# Patient Record
Sex: MALE | URBAN - METROPOLITAN AREA
[De-identification: names, ages, dates, MRNs, and addresses within clinical notes are randomized per-mention and may not be internally consistent; named-entity substitution may affect disease eponyms.]

---

## 2018-06-01 ENCOUNTER — APPOINTMENT (RX ONLY)
Dept: URBAN - METROPOLITAN AREA CLINIC 128 | Facility: CLINIC | Age: 38
Setting detail: DERMATOLOGY
End: 2018-06-01

## 2018-06-01 DIAGNOSIS — L71.8 OTHER ROSACEA: ICD-10-CM

## 2018-06-01 DIAGNOSIS — L738 OTHER SPECIFIED DISEASES OF HAIR AND HAIR FOLLICLES: ICD-10-CM

## 2018-06-01 DIAGNOSIS — L73.9 FOLLICULAR DISORDER, UNSPECIFIED: ICD-10-CM

## 2018-06-01 DIAGNOSIS — L72.0 EPIDERMAL CYST: ICD-10-CM

## 2018-06-01 DIAGNOSIS — Z00.00 ENCOUNTER FOR GENERAL ADULT MEDICAL EXAMINATION WITHOUT ABNORMAL FINDINGS: ICD-10-CM

## 2018-06-01 DIAGNOSIS — L70.8 OTHER ACNE: ICD-10-CM

## 2018-06-01 DIAGNOSIS — L663 OTHER SPECIFIED DISEASES OF HAIR AND HAIR FOLLICLES: ICD-10-CM

## 2018-06-01 DIAGNOSIS — L81.4 OTHER MELANIN HYPERPIGMENTATION: ICD-10-CM

## 2018-06-01 PROBLEM — Z71.1 PERSON WITH FEARED HEALTH COMPLAINT IN WHOM NO DIAGNOSIS IS MADE: Status: ACTIVE | Noted: 2018-06-01

## 2018-06-01 PROBLEM — L02.223 FURUNCLE OF CHEST WALL: Status: ACTIVE | Noted: 2018-06-01

## 2018-06-01 PROBLEM — L02.222 FURUNCLE OF BACK [ANY PART, EXCEPT BUTTOCK]: Status: ACTIVE | Noted: 2018-06-01

## 2018-06-01 PROCEDURE — ? COUNSELING

## 2018-06-01 PROCEDURE — ? TREATMENT REGIMEN

## 2018-06-01 PROCEDURE — 99203 OFFICE O/P NEW LOW 30 MIN: CPT

## 2018-06-01 ASSESSMENT — LOCATION SIMPLE DESCRIPTION DERM
LOCATION SIMPLE: CHEST
LOCATION SIMPLE: LEFT CHEEK
LOCATION SIMPLE: UPPER BACK
LOCATION SIMPLE: RIGHT FOREARM
LOCATION SIMPLE: LEFT FOREARM
LOCATION SIMPLE: RIGHT FOOT
LOCATION SIMPLE: LEFT UPPER BACK
LOCATION SIMPLE: RIGHT UPPER BACK
LOCATION SIMPLE: PENIS

## 2018-06-01 ASSESSMENT — LOCATION DETAILED DESCRIPTION DERM
LOCATION DETAILED: LEFT INFERIOR CENTRAL MALAR CHEEK
LOCATION DETAILED: RIGHT LATERAL PLANTAR FOOT
LOCATION DETAILED: RIGHT PROXIMAL DORSAL FOREARM
LOCATION DETAILED: RIGHT SUPERIOR LATERAL UPPER BACK
LOCATION DETAILED: STERNUM
LOCATION DETAILED: VENTRAL PENILE SHAFT
LOCATION DETAILED: LEFT SUPERIOR LATERAL UPPER BACK
LOCATION DETAILED: LEFT DISTAL DORSAL FOREARM
LOCATION DETAILED: INFERIOR THORACIC SPINE

## 2018-06-01 ASSESSMENT — LOCATION ZONE DERM
LOCATION ZONE: TRUNK
LOCATION ZONE: ARM
LOCATION ZONE: FACE
LOCATION ZONE: PENIS
LOCATION ZONE: FEET

## 2018-06-01 NOTE — PROCEDURE: COUNSELING
Detail Level: Zone
Detail Level: Simple
Patient Specific Counseling (Will Not Stick From Patient To Patient): Pt denies any discharge, dysuria, or tenderness to touch. No visible erythema, edema, or skin changes present. No ulcerations, vesicles, papules or macules present.
Patient Specific Counseling (Will Not Stick From Patient To Patient): ** Pt declines tx

## 2018-06-01 NOTE — PROCEDURE: TREATMENT REGIMEN
Detail Level: Zone
Initiate Treatment: Physical Sunscreen qam\\nGentle face wash daily (Cetaphil)
Continue Regimen: Metronidazole cream qhs
Discontinue Regimen: Clotrimazole and betamethasone diproprionate cream

## 2018-06-21 ENCOUNTER — NEW PATIENT (OUTPATIENT)
Dept: URBAN - METROPOLITAN AREA CLINIC 26 | Facility: CLINIC | Age: 38
End: 2018-06-21

## 2018-06-21 VITALS — WEIGHT: 236 LBS | HEIGHT: 74 IN | BODY MASS INDEX: 30.29 KG/M2

## 2018-06-21 DIAGNOSIS — H10.33: ICD-10-CM

## 2018-06-21 DIAGNOSIS — H43.393: ICD-10-CM

## 2018-06-21 DIAGNOSIS — H52.13: ICD-10-CM

## 2018-06-21 PROCEDURE — 92225 OPHTHALMOSCOPY (INITIAL): CPT

## 2018-06-21 PROCEDURE — 92004 COMPRE OPH EXAM NEW PT 1/>: CPT

## 2018-06-21 ASSESSMENT — TONOMETRY
OS_IOP_MMHG: 12
OD_IOP_MMHG: 13

## 2018-06-21 ASSESSMENT — VISUAL ACUITY
OD_SC: 20/20-
OS_SC: 20/20-

## 2018-07-06 ENCOUNTER — APPOINTMENT (RX ONLY)
Dept: URBAN - METROPOLITAN AREA CLINIC 128 | Facility: CLINIC | Age: 38
Setting detail: DERMATOLOGY
End: 2018-07-06

## 2018-07-06 DIAGNOSIS — I78.8 OTHER DISEASES OF CAPILLARIES: ICD-10-CM

## 2018-07-06 DIAGNOSIS — L81.4 OTHER MELANIN HYPERPIGMENTATION: ICD-10-CM

## 2018-07-06 DIAGNOSIS — L21.8 OTHER SEBORRHEIC DERMATITIS: ICD-10-CM

## 2018-07-06 DIAGNOSIS — L98.8 OTHER SPECIFIED DISORDERS OF THE SKIN AND SUBCUTANEOUS TISSUE: ICD-10-CM

## 2018-07-06 DIAGNOSIS — L71.8 OTHER ROSACEA: ICD-10-CM

## 2018-07-06 PROCEDURE — ? COUNSELING

## 2018-07-06 PROCEDURE — ? PRESCRIPTION

## 2018-07-06 PROCEDURE — 99213 OFFICE O/P EST LOW 20 MIN: CPT

## 2018-07-06 PROCEDURE — ? OTHER

## 2018-07-06 PROCEDURE — ? TREATMENT REGIMEN

## 2018-07-06 RX ORDER — IVERMECTIN 10 MG/G
CREAM TOPICAL
Qty: 1 | Refills: 3 | Status: ERX | COMMUNITY
Start: 2018-07-06

## 2018-07-06 RX ORDER — DOXYCYCLINE HYCLATE 50 MG/1
TABLET, FILM COATED ORAL
Qty: 30 | Refills: 2 | Status: ERX | COMMUNITY
Start: 2018-07-06

## 2018-07-06 RX ADMIN — DOXYCYCLINE HYCLATE: 50 TABLET, FILM COATED ORAL at 15:37

## 2018-07-06 RX ADMIN — IVERMECTIN: 10 CREAM TOPICAL at 15:37

## 2018-07-06 ASSESSMENT — LOCATION SIMPLE DESCRIPTION DERM
LOCATION SIMPLE: LEFT EYEBROW
LOCATION SIMPLE: LEFT CHEEK
LOCATION SIMPLE: RIGHT EYEBROW
LOCATION SIMPLE: RIGHT NOSE
LOCATION SIMPLE: RIGHT CHEEK
LOCATION SIMPLE: LEFT NOSE

## 2018-07-06 ASSESSMENT — LOCATION DETAILED DESCRIPTION DERM
LOCATION DETAILED: RIGHT CENTRAL MALAR CHEEK
LOCATION DETAILED: RIGHT NASAL ALA
LOCATION DETAILED: LEFT MEDIAL MALAR CHEEK
LOCATION DETAILED: RIGHT INFERIOR CENTRAL MALAR CHEEK
LOCATION DETAILED: LEFT INFERIOR CENTRAL MALAR CHEEK
LOCATION DETAILED: LEFT CENTRAL EYEBROW
LOCATION DETAILED: LEFT NASAL SIDEWALL
LOCATION DETAILED: LEFT CENTRAL MALAR CHEEK
LOCATION DETAILED: RIGHT CENTRAL EYEBROW

## 2018-07-06 ASSESSMENT — LOCATION ZONE DERM
LOCATION ZONE: NOSE
LOCATION ZONE: FACE

## 2018-07-06 NOTE — PROCEDURE: OTHER
Detail Level: Zone
Note Text (......Xxx Chief Complaint.): This diagnosis correlates with the
Other (Free Text): Discussed with pt about topical retinols to help shrink pores, pt would like to wait on doing the retinol treatment.

## 2018-07-06 NOTE — PROCEDURE: TREATMENT REGIMEN
Initiate Treatment: Pt declines treatment today 7/6/18
Detail Level: Zone
Samples Given: Israel Saldustyes

## 2019-07-30 NOTE — PATIENT DISCUSSION
The patient was informed that a toric lens is recommended due to the amount of corneal astigmatism. The patient understands there is a possibility they may need a lens rotation or enhancement after surgery.

## 2019-07-30 NOTE — PATIENT DISCUSSION
The patient was informed that with [address] [facility] for distance, they will need glasses for all near and intermediate activities after surgery. The patient understands there is a possibility they may need an enhancement after surgery. The patient elects [employer] Vision Toric OS, goal of emmetropia.

## 2019-11-05 NOTE — PATIENT DISCUSSION
The patient was informed that with [address] [facility] for distance, they will need glasses for all near and intermediate activities after surgery. The patient understands there is a possibility they may need an enhancement after surgery. The patient elects Custom Vision Toric OS, goal of emmetropia.

## 2019-11-05 NOTE — PATIENT DISCUSSION
The patient was informed that with [address] [facility] for distance, they will need glasses for all near and intermediate activities after surgery. The patient understands there is a possibility they may need an enhancement after surgery. The patient elects Custom Vision Toric OS, goal of emmetropia.

## 2019-11-06 NOTE — PATIENT DISCUSSION
Cataract surgery has been performed in the first eye and activities of daily living are still impaired. The patient would like to proceed with cataract surgery in the second eye as scheduled. The patient elects Custom Toric OD, goal of [place].

## 2019-12-12 NOTE — PATIENT DISCUSSION
4 week PO: Patient is doing well post-operatively. Patient to call if any visual changes or concerns.

## 2019-12-12 NOTE — PATIENT DISCUSSION
5 week PO: Patient is doing well post-operatively. Patient to call if any visual changes or concerns.

## 2020-10-29 NOTE — PATIENT DISCUSSION
Instructed to call immediately if any new distortion, blurring, decreased vision or eye pain. PROCEDURE INFORMATION:

Exam: CT Head Without Contrast

Exam date and time: 10/28/2020 7:44 AM

Age: 83 years old

Clinical indication: Injury or trauma; Fall; Concussion/head injury; 
Consciousness not specified;

Injury date: 10/28/2020; Injury details: Syncopal event this morning at home. 
Fell to tiled floor, pain at

base of left side neck adjacent to c7 vertebra. Transient loc, on xarelto RX



TECHNIQUE:

Imaging protocol: Computed tomography of the head without contrast.



COMPARISON:

No relevant prior studies available.



FINDINGS: Images are slightly degraded by patient motion.

Brain: Normal. No hemorrhage. Unremarkable white matter. No mass effect.

Cerebral ventricles: No ventriculomegaly.

Bones/joints: Unremarkable. No acute fracture.

Paranasal sinuses: Visualized sinuses are unremarkable. No fluid levels.

Mastoid air cells: Visualized mastoid air cells are well aerated.

Soft tissues: Unremarkable.



IMPRESSION:

No acute intracranial abnormality.



Thank you for allowing us to participate in the care of your patient.



Dictated and Authenticated by: Adelina Mckeon MD

10/28/2020 10:37 AM Central Time ([place] & [place])

[facility]

## 2022-07-08 NOTE — PATIENT DISCUSSION
Continued losartan 100mg and HCTZ 25 mg, with hydralazine 25 mg PO q8h PRN  Added nifedipine 6/18  Consulted PharmD for med history: patient reports no longer taking isosorbide-hydralazine, losartan, and losartan-HCTZ  Discontinued ARB, held HCTZ due to increasing sCr. Continued nifedipine for BP control.  Resumed diuretic when renal insufficiency improved; but did not tolerate so stopped  Hydralazine dose changed multiple times; isordil added to hydralazine on 7/2   Retinal tear and detachment warning symptoms reviewed and patient instructed to call immediately if increasing floaters, flashes, or decreasing peripheral vision.